# Patient Record
Sex: FEMALE | NOT HISPANIC OR LATINO
[De-identification: names, ages, dates, MRNs, and addresses within clinical notes are randomized per-mention and may not be internally consistent; named-entity substitution may affect disease eponyms.]

---

## 2018-11-26 PROBLEM — Z00.00 ENCOUNTER FOR PREVENTIVE HEALTH EXAMINATION: Status: ACTIVE | Noted: 2018-11-26

## 2018-12-21 ENCOUNTER — APPOINTMENT (OUTPATIENT)
Dept: UROLOGY | Facility: CLINIC | Age: 73
End: 2018-12-21
Payer: MEDICARE

## 2018-12-21 VITALS
BODY MASS INDEX: 32.46 KG/M2 | HEIGHT: 59 IN | SYSTOLIC BLOOD PRESSURE: 134 MMHG | WEIGHT: 161 LBS | HEART RATE: 73 BPM | RESPIRATION RATE: 17 BRPM | TEMPERATURE: 98.2 F | DIASTOLIC BLOOD PRESSURE: 76 MMHG

## 2018-12-21 DIAGNOSIS — Z82.49 FAMILY HISTORY OF ISCHEMIC HEART DISEASE AND OTHER DISEASES OF THE CIRCULATORY SYSTEM: ICD-10-CM

## 2018-12-21 DIAGNOSIS — Z86.79 PERSONAL HISTORY OF OTHER DISEASES OF THE CIRCULATORY SYSTEM: ICD-10-CM

## 2018-12-21 DIAGNOSIS — Z80.3 FAMILY HISTORY OF MALIGNANT NEOPLASM OF BREAST: ICD-10-CM

## 2018-12-21 DIAGNOSIS — Z87.39 PERSONAL HISTORY OF OTHER DISEASES OF THE MUSCULOSKELETAL SYSTEM AND CONNECTIVE TISSUE: ICD-10-CM

## 2018-12-21 DIAGNOSIS — Z82.3 FAMILY HISTORY OF STROKE: ICD-10-CM

## 2018-12-21 DIAGNOSIS — H91.93 UNSPECIFIED HEARING LOSS, BILATERAL: ICD-10-CM

## 2018-12-21 DIAGNOSIS — Z86.018 PERSONAL HISTORY OF OTHER BENIGN NEOPLASM: ICD-10-CM

## 2018-12-21 DIAGNOSIS — Z78.9 OTHER SPECIFIED HEALTH STATUS: ICD-10-CM

## 2018-12-21 DIAGNOSIS — Z86.39 PERSONAL HISTORY OF OTHER ENDOCRINE, NUTRITIONAL AND METABOLIC DISEASE: ICD-10-CM

## 2018-12-21 PROCEDURE — 99203 OFFICE O/P NEW LOW 30 MIN: CPT

## 2018-12-24 LAB
APPEARANCE: CLEAR
BACTERIA: NEGATIVE
BILIRUBIN URINE: NEGATIVE
BLOOD URINE: NEGATIVE
COLOR: ABNORMAL
GLUCOSE QUALITATIVE U: NEGATIVE MG/DL
KETONES URINE: NEGATIVE
LEUKOCYTE ESTERASE URINE: NEGATIVE
MICROSCOPIC-UA: NORMAL
NITRITE URINE: NEGATIVE
PH URINE: 7
PROTEIN URINE: NEGATIVE MG/DL
RED BLOOD CELLS URINE: 1 /HPF
SPECIFIC GRAVITY URINE: 1.01
SQUAMOUS EPITHELIAL CELLS: 0 /HPF
UROBILINOGEN URINE: NEGATIVE MG/DL
WHITE BLOOD CELLS URINE: 2 /HPF

## 2018-12-27 RX ORDER — ATENOLOL 25 MG/1
25 TABLET ORAL
Refills: 0 | Status: ACTIVE | COMMUNITY

## 2018-12-27 RX ORDER — CHOLECALCIFEROL (VITAMIN D3) 25 MCG
TABLET,CHEWABLE ORAL
Refills: 0 | Status: ACTIVE | COMMUNITY

## 2018-12-27 RX ORDER — LECITHIN 1200 MG
CAPSULE ORAL
Refills: 0 | Status: ACTIVE | COMMUNITY

## 2018-12-27 RX ORDER — FLAXSEED OIL 1000 MG
CAPSULE ORAL
Refills: 0 | Status: ACTIVE | COMMUNITY

## 2018-12-27 RX ORDER — MAG HYDROX/ALUMINUM HYD/SIMETH 400-400-40
SUSPENSION, ORAL (FINAL DOSE FORM) ORAL
Refills: 0 | Status: ACTIVE | COMMUNITY

## 2018-12-27 RX ORDER — THYROID, PORCINE 90 MG/1
TABLET ORAL
Refills: 0 | Status: ACTIVE | COMMUNITY

## 2019-01-09 ENCOUNTER — APPOINTMENT (OUTPATIENT)
Age: 74
End: 2019-01-09
Payer: MEDICARE

## 2019-01-09 ENCOUNTER — TRANSCRIPTION ENCOUNTER (OUTPATIENT)
Age: 74
End: 2019-01-09

## 2019-01-09 DIAGNOSIS — N28.89 OTHER SPECIFIED DISORDERS OF KIDNEY AND URETER: ICD-10-CM

## 2019-01-09 PROCEDURE — 99214 OFFICE O/P EST MOD 30 MIN: CPT

## 2019-01-15 NOTE — ASSESSMENT
[FreeTextEntry1] : CT scan reviewed- no renal mass.  small stones, up to ~ 3-4 mm right lower.  Small parapelvic cyst left lower.  Clips c/w prior surgery around left lower pole.  \par \par Possible left renal artery stenosis, reported as 'thrombus', but unsure of that--appears possible, or else stenosis which could play into concerns about recent BP spiking..There appears to be some atherosclerotic change on delayed films, but what appears to be filling defect within the left renal artery on arterial phase.\par \par Recommend vascular surgery eval (either here, or nearer her home), as renal artery stenosis may be causative for unstable hypertensive episodes.\par RTC 6 months with renal us with us\par stone prevention diet reviewed, sheets given

## 2019-01-15 NOTE — PHYSICAL EXAM
[General Appearance - Well Developed] : well developed [General Appearance - Well Nourished] : well nourished [Normal Appearance] : normal appearance [Well Groomed] : well groomed [General Appearance - In No Acute Distress] : no acute distress [Abdomen Soft] : soft [Abdomen Tenderness] : non-tender [Costovertebral Angle Tenderness] : no ~M costovertebral angle tenderness

## 2019-01-15 NOTE — HISTORY OF PRESENT ILLNESS
[FreeTextEntry1] : Pt is 72 yo female with h/o left renal cyst excision at [facility] (by me) in 2006- had done well since, though I haven't seen since. Has not had any new clinical issues to report: no flank or abdominal pain, no gross hematuria, no voiding symptoms such as dysuria, urge/frequency. \par \par BP changes ("spikes at night") recently led to further study. Had recent renal us for f/u imaging, which demonstrated new findings: arrives for evaluation and discussion. No recent 3 d imaging with CT or MRI, but she returns today with CT scan w/wo IV contrast.\par

## 2019-01-15 NOTE — LETTER BODY
[Dear  ___] : Dear  [unfilled], [Consult Letter:] : I had the pleasure of evaluating your patient, [unfilled]. [Please see my note below.] : Please see my note below. [Consult Closing:] : Thank you very much for allowing me to participate in the care of this patient.  If you have any questions, please do not hesitate to contact me. [Sincerely,] : Sincerely, [FreeTextEntry1] : Pt is 72 yo female with h/o left renal cyst excision at [facility] (by me) in 2006- had done well since, though I haven't seen since. Has not had any new clinical issues to report: no flank or abdominal pain, no gross hematuria, no voiding symptoms such as dysuria, urge/frequency. \par \par BP changes ("spikes at night") recently led to further study. Had recent renal us for f/u imaging, which demonstrated new findings: arrives for evaluation and discussion. No recent 3 d imaging with CT or MRI, but she returns today with CT scan w/wo IV contrast.\par \par \par CT scan reviewed- no renal mass.  small stones, up to ~ 3-4 mm right lower.  Small parapelvic cyst left lower.  Clips c/w prior surgery around left lower pole.  \par \par Possible left renal artery stenosis, reported as 'thrombus', but unsure of that--appears possible, or else stenosis which could play into concerns about recent BP spiking..There appears to be some atherosclerotic change on delayed films, but what appears to be filling defect within the left renal artery on arterial phase.\par \par Recommend vascular surgery eval (either here, or nearer her home), as renal artery stenosis may be causative for unstable hypertensive episodes.\par RTC 6 months with renal us with us\par stone prevention diet reviewed, sheets given

## 2019-03-04 ENCOUNTER — TRANSCRIPTION ENCOUNTER (OUTPATIENT)
Age: 74
End: 2019-03-04

## 2019-07-05 ENCOUNTER — OUTPATIENT (OUTPATIENT)
Dept: OUTPATIENT SERVICES | Facility: HOSPITAL | Age: 74
LOS: 1 days | End: 2019-07-05
Payer: MEDICARE

## 2019-07-05 ENCOUNTER — APPOINTMENT (OUTPATIENT)
Dept: UROLOGY | Facility: CLINIC | Age: 74
End: 2019-07-05
Payer: MEDICARE

## 2019-07-05 PROCEDURE — 99213 OFFICE O/P EST LOW 20 MIN: CPT | Mod: 25

## 2019-07-05 PROCEDURE — 76775 US EXAM ABDO BACK WALL LIM: CPT | Mod: 26

## 2019-07-05 PROCEDURE — 76775 US EXAM ABDO BACK WALL LIM: CPT

## 2019-07-05 NOTE — PHYSICAL EXAM
[General Appearance - Well Developed] : well developed [General Appearance - Well Nourished] : well nourished [Normal Appearance] : normal appearance [Well Groomed] : well groomed [Abdomen Soft] : soft [General Appearance - In No Acute Distress] : no acute distress [Costovertebral Angle Tenderness] : no ~M costovertebral angle tenderness [] : no respiratory distress [Abdomen Tenderness] : non-tender [Exaggerated Use Of Accessory Muscles For Inspiration] : no accessory muscle use [Respiration, Rhythm And Depth] : normal respiratory rhythm and effort [Oriented To Time, Place, And Person] : oriented to person, place, and time [Mood] : the mood was normal [Not Anxious] : not anxious [Affect] : the affect was normal [Normal Station and Gait] : the gait and station were normal for the patient's age [No Focal Deficits] : no focal deficits

## 2019-07-05 NOTE — ASSESSMENT
[FreeTextEntry1] : Renal US films reviewed: No stone visualized either kidney. Stable post surgical changes in the lower pole left kidney and bilateral sub centimeter simple para pelvic cysts. Both kidneys are normal in size and echogenicity without hydronephrosis or solid masses. \par \par Doing well from  perspective\par \par Plan: RTC 6 months with f/u renal us\par No sig stones seen; no evidence for sig progression of small stones seen on prior CT scan\par pt due for f/u with vascular surgeon as well

## 2019-07-05 NOTE — HISTORY OF PRESENT ILLNESS
[None] : no symptoms [FreeTextEntry1] : Pt is 72 yo female with likely mild renal artery stenosis, f/u with Vascular surgeon for that and left carotid.  O/w, blood pressure currently under 'good' control, perhaps 6 or 7 occasions where she needed additional atenolol.  \par \par No flank or abdominal pain, no changes to urinary symptoms or hematuria.

## 2019-07-08 DIAGNOSIS — N28.1 CYST OF KIDNEY, ACQUIRED: ICD-10-CM

## 2019-07-08 DIAGNOSIS — N20.0 CALCULUS OF KIDNEY: ICD-10-CM

## 2020-01-15 ENCOUNTER — APPOINTMENT (OUTPATIENT)
Dept: UROLOGY | Facility: CLINIC | Age: 75
End: 2020-01-15
Payer: MEDICARE

## 2020-01-15 ENCOUNTER — OUTPATIENT (OUTPATIENT)
Dept: OUTPATIENT SERVICES | Facility: HOSPITAL | Age: 75
LOS: 1 days | End: 2020-01-15
Payer: MEDICARE

## 2020-01-15 DIAGNOSIS — R35.0 FREQUENCY OF MICTURITION: ICD-10-CM

## 2020-01-15 PROCEDURE — 99213 OFFICE O/P EST LOW 20 MIN: CPT | Mod: 25

## 2020-01-15 PROCEDURE — 76775 US EXAM ABDO BACK WALL LIM: CPT | Mod: 26

## 2020-01-15 PROCEDURE — 76775 US EXAM ABDO BACK WALL LIM: CPT

## 2020-01-15 NOTE — PHYSICAL EXAM
[Normal Appearance] : normal appearance [General Appearance - Well Nourished] : well nourished [General Appearance - Well Developed] : well developed [Abdomen Soft] : soft [Well Groomed] : well groomed [General Appearance - In No Acute Distress] : no acute distress [Abdomen Tenderness] : non-tender [Costovertebral Angle Tenderness] : no ~M costovertebral angle tenderness [] : no respiratory distress [Exaggerated Use Of Accessory Muscles For Inspiration] : no accessory muscle use [Oriented To Time, Place, And Person] : oriented to person, place, and time [Affect] : the affect was normal [Respiration, Rhythm And Depth] : normal respiratory rhythm and effort [Mood] : the mood was normal [Normal Station and Gait] : the gait and station were normal for the patient's age [Not Anxious] : not anxious [No Focal Deficits] : no focal deficits

## 2020-01-15 NOTE — HISTORY OF PRESENT ILLNESS
[FreeTextEntry1] : Pt is 73 yo female with likely mild renal artery stenosis, f/u with Vascular surgeon for that and left carotid.  O/w, blood pressure currently under 'good' control, perhaps 6 or 7 occasions where she needed additional atenolol.  No new complaints.\par \par No flank or abdominal pain, no changes to urinary symptoms or hematuria.   [None] : no symptoms

## 2020-01-15 NOTE — ASSESSMENT
[FreeTextEntry1] : Renal US reviewed: no stone visualized today, no hydro. Likely no change to small left parapelvic renal cyst. right renal pelvis with mild fullness. Increased RI c/w recent vascular study suggestive of 60% right renal artery stenosis. No solid mass.\par \par Comparison to CT 12/2018--- likely small stones not visualized on the current study. Overall appears stable, and pt doing well from  perspective\par \par Plan: RTC 12 months with f/u renal us\par No sig stones seen; no evidence for sig progression of small stones seen on prior CT scan\par f/u

## 2020-01-23 DIAGNOSIS — N20.0 CALCULUS OF KIDNEY: ICD-10-CM

## 2020-01-23 DIAGNOSIS — N28.1 CYST OF KIDNEY, ACQUIRED: ICD-10-CM

## 2020-01-23 DIAGNOSIS — I70.1 ATHEROSCLEROSIS OF RENAL ARTERY: ICD-10-CM

## 2021-03-31 ENCOUNTER — APPOINTMENT (OUTPATIENT)
Dept: UROLOGY | Facility: CLINIC | Age: 76
End: 2021-03-31
Payer: MEDICARE

## 2021-03-31 ENCOUNTER — APPOINTMENT (OUTPATIENT)
Dept: UROLOGY | Facility: CLINIC | Age: 76
End: 2021-03-31

## 2021-03-31 ENCOUNTER — OUTPATIENT (OUTPATIENT)
Dept: OUTPATIENT SERVICES | Facility: HOSPITAL | Age: 76
LOS: 1 days | End: 2021-03-31
Payer: MEDICARE

## 2021-03-31 DIAGNOSIS — R35.0 FREQUENCY OF MICTURITION: ICD-10-CM

## 2021-03-31 PROCEDURE — 76775 US EXAM ABDO BACK WALL LIM: CPT | Mod: 26

## 2021-03-31 PROCEDURE — 76775 US EXAM ABDO BACK WALL LIM: CPT

## 2021-03-31 PROCEDURE — 99214 OFFICE O/P EST MOD 30 MIN: CPT | Mod: 25

## 2021-03-31 NOTE — HISTORY OF PRESENT ILLNESS
[None] : no symptoms [FreeTextEntry1] : Pt is 74 yo female with likely mild renal artery stenosis, f/u with Vascular surgeon for that and left carotid.  O/w, blood pressure currently under 'good' control, perhaps 6 or 7 occasions where she needed additional atenolol.  No new complaints.  F/u today with renal US, no sig clinical changes\par \par No flank or abdominal pain, no changes to urinary symptoms or hematuria.

## 2021-03-31 NOTE — PHYSICAL EXAM
[General Appearance - Well Nourished] : well nourished [General Appearance - Well Developed] : well developed [Normal Appearance] : normal appearance [Well Groomed] : well groomed [General Appearance - In No Acute Distress] : no acute distress [Abdomen Soft] : soft [Abdomen Tenderness] : non-tender [Costovertebral Angle Tenderness] : no ~M costovertebral angle tenderness [] : no respiratory distress [Respiration, Rhythm And Depth] : normal respiratory rhythm and effort [Exaggerated Use Of Accessory Muscles For Inspiration] : no accessory muscle use [Oriented To Time, Place, And Person] : oriented to person, place, and time [Affect] : the affect was normal [Not Anxious] : not anxious [Mood] : the mood was normal [Normal Station and Gait] : the gait and station were normal for the patient's age [No Focal Deficits] : no focal deficits

## 2021-03-31 NOTE — ASSESSMENT
[FreeTextEntry1] : Renal US reviewed: No stone in either kidney, no hydronephrosis, no definite mass visualized in the left kidney; surgical changes and an echogenic focus slightly vascular with shadow possible AML vs intrarenal fat and a para pelvic cyst again visualized in the lower pole. Both kidneys are normal in size and echogenicity without obvious stones or  hydronephrosis visualized. \par \par Comparison to CT 12/2018--- likely small stones not visualized on the current study. Overall appears stable, and pt doing well from  perspective\par \par D/w pt- doubt sig finding left lower pole; ? intrarenal fat vs AML. We discussed imaging; nothing seen on outside renal/abdominal US.  Further imaging with CT w/wo IV contrast or MRI w/wo IV contrast could be done.\par \par Plan: pt prefers to review CT scan for f/u ? renal mass.\par \par CT w/wo IV contrast renal mass protocol, and eval current stones

## 2021-04-02 DIAGNOSIS — N28.1 CYST OF KIDNEY, ACQUIRED: ICD-10-CM

## 2021-04-02 DIAGNOSIS — N20.0 CALCULUS OF KIDNEY: ICD-10-CM

## 2021-04-02 DIAGNOSIS — I70.1 ATHEROSCLEROSIS OF RENAL ARTERY: ICD-10-CM

## 2021-04-19 ENCOUNTER — TRANSCRIPTION ENCOUNTER (OUTPATIENT)
Age: 76
End: 2021-04-19

## 2021-04-21 ENCOUNTER — TRANSCRIPTION ENCOUNTER (OUTPATIENT)
Age: 76
End: 2021-04-21

## 2021-04-23 ENCOUNTER — NON-APPOINTMENT (OUTPATIENT)
Age: 76
End: 2021-04-23

## 2022-03-30 ENCOUNTER — APPOINTMENT (OUTPATIENT)
Dept: UROLOGY | Facility: CLINIC | Age: 77
End: 2022-03-30
Payer: MEDICARE

## 2022-03-30 ENCOUNTER — OUTPATIENT (OUTPATIENT)
Dept: OUTPATIENT SERVICES | Facility: HOSPITAL | Age: 77
LOS: 1 days | End: 2022-03-30
Payer: MEDICARE

## 2022-03-30 DIAGNOSIS — N28.1 CYST OF KIDNEY, ACQUIRED: ICD-10-CM

## 2022-03-30 DIAGNOSIS — N20.0 CALCULUS OF KIDNEY: ICD-10-CM

## 2022-03-30 DIAGNOSIS — I70.1 ATHEROSCLEROSIS OF RENAL ARTERY: ICD-10-CM

## 2022-03-30 DIAGNOSIS — R35.0 FREQUENCY OF MICTURITION: ICD-10-CM

## 2022-03-30 PROCEDURE — 99214 OFFICE O/P EST MOD 30 MIN: CPT | Mod: 25

## 2022-03-30 PROCEDURE — 76775 US EXAM ABDO BACK WALL LIM: CPT

## 2022-03-30 PROCEDURE — 76775 US EXAM ABDO BACK WALL LIM: CPT | Mod: 26

## 2022-03-30 NOTE — HISTORY OF PRESENT ILLNESS
[None] : None [FreeTextEntry1] : Pt is 76 yo female with likely mild renal artery stenosis, f/u with Vascular surgeon for that and left carotid.  O/w, blood pressure currently under 'good' control, perhaps 6 or 7 occasions where she needed additional atenolol.  No new complaints.  F/u today with renal US, no sig clinical changes\par \par No flank or abdominal pain, no changes to urinary symptoms or hematuria.

## 2022-03-30 NOTE — ASSESSMENT
[FreeTextEntry1] : Renal US reviewed: No stone in either kidney, no hydronephrosis, no definite mass visualized in the left kidney; surgical changes and a echogenic foci likely unchanged in the lower pole. There is twinkle without shadowing. Both kidneys are normal in size and echogenicity without definite stones or hydronephrosis visualized, no solid renal mass.. Resistive indices at range for hypertensive, and relatively similar right to left on comparison\par \par Comparison to 4/2021 CT does not show definitive stone, possible small stone vs parenchymal calcification. Recommend serial US imaging. \par \par D/w pt-\par prefers f/u in 1 year with renal US- will schedule

## 2022-03-30 NOTE — PHYSICAL EXAM
[General Appearance - Well Developed] : well developed [General Appearance - Well Nourished] : well nourished [Normal Appearance] : normal appearance [Well Groomed] : well groomed [General Appearance - In No Acute Distress] : no acute distress [Abdomen Soft] : soft [Abdomen Tenderness] : non-tender [Costovertebral Angle Tenderness] : no ~M costovertebral angle tenderness [Respiration, Rhythm And Depth] : normal respiratory rhythm and effort [Exaggerated Use Of Accessory Muscles For Inspiration] : no accessory muscle use [Oriented To Time, Place, And Person] : oriented to person, place, and time [Affect] : the affect was normal [Mood] : the mood was normal [Not Anxious] : not anxious [Normal Station and Gait] : the gait and station were normal for the patient's age [No Focal Deficits] : no focal deficits [] : no rash [FreeTextEntry1] : no jvd

## 2023-01-10 ENCOUNTER — TRANSCRIPTION ENCOUNTER (OUTPATIENT)
Age: 78
End: 2023-01-10

## 2023-01-11 ENCOUNTER — TRANSCRIPTION ENCOUNTER (OUTPATIENT)
Age: 78
End: 2023-01-11

## 2023-01-12 ENCOUNTER — TRANSCRIPTION ENCOUNTER (OUTPATIENT)
Age: 78
End: 2023-01-12

## 2023-01-13 ENCOUNTER — TRANSCRIPTION ENCOUNTER (OUTPATIENT)
Age: 78
End: 2023-01-13

## 2023-02-06 ENCOUNTER — TRANSCRIPTION ENCOUNTER (OUTPATIENT)
Age: 78
End: 2023-02-06

## 2023-02-07 ENCOUNTER — TRANSCRIPTION ENCOUNTER (OUTPATIENT)
Age: 78
End: 2023-02-07

## 2023-02-07 ENCOUNTER — APPOINTMENT (OUTPATIENT)
Dept: UROLOGY | Facility: CLINIC | Age: 78
End: 2023-02-07

## 2023-02-10 ENCOUNTER — TRANSCRIPTION ENCOUNTER (OUTPATIENT)
Age: 78
End: 2023-02-10

## 2023-02-13 ENCOUNTER — TRANSCRIPTION ENCOUNTER (OUTPATIENT)
Age: 78
End: 2023-02-13

## 2023-02-14 ENCOUNTER — APPOINTMENT (OUTPATIENT)
Dept: UROLOGY | Facility: CLINIC | Age: 78
End: 2023-02-14
Payer: MEDICARE

## 2023-02-14 PROCEDURE — 99442: CPT | Mod: 95

## 2023-02-14 NOTE — ASSESSMENT
[FreeTextEntry1] : All films reviewed, d/w pt.\par \par She will come for sched appt with office renal US in f/u to re-eval and compare.

## 2023-02-14 NOTE — HISTORY OF PRESENT ILLNESS
[Other Location: e.g. School (Enter Location, City,State)___] : at [unfilled], at the time of the visit. [Other Location: e.g. Home (Enter Location, City,State)___] : at [unfilled] [Verbal consent obtained from patient] : the patient, [unfilled] [FreeTextEntry1] : The patient-doctor relationship has been established in a face to face fashion via real time video/audio HIPAA compliant communication using telemedicine software. The patient's identity has been confirmed. The patient was previously emailed a copy of the telemedicine consent. They have had a chance to review and has now given verbal consent and has requested care to be assessed and treated via telemedicine. They understand there may be limitations in this process, and that they may need further followup care in the office and/or hospital settings.\par \par Verbal consent given on Feb 14 2023 10:00AM\par \par SHIVAM JOHNSON is a 77 year F who presents for f/u stone, complex renal cyst, mild left renal artery stenosis. Previous left renal complex cyst excision [facility] in 2006.\par \par Had Er visit with right flank pain, extreme difficulty with urination, some reported blood on UA, and sx resolved after approx 4 hours ---on 12/26/22. Renal US done showing mildly complex 1.5 cm left lower pole parapelvic cyst, ? small stones on left.\par \par Pt had MRI post to eval ([facility])- no sig complexity to cyst.\par \par Feeling well at this time.\par \par Renal US and MRI reviewed: echogenic focus left upper parenchyma, but appears vascular and non-shadowing. Similar left lower pole. Neither with shadowing.\par \par \par 02/14/2023 \par \par The patient denies fevers, chills, nausea and/or vomiting, and no unexplained weight loss.\par \par All pertinent parts of the patient PFSH (past medical, family, and social histories), laboratory, radiological studies and available physician notes were reviewed prior to starting the face-to-face portion of the telemedicine visit. Questionnaire results, where appropriate, were discussed with the patient.\par

## 2023-03-29 ENCOUNTER — OUTPATIENT (OUTPATIENT)
Dept: OUTPATIENT SERVICES | Facility: HOSPITAL | Age: 78
LOS: 1 days | End: 2023-03-29
Payer: MEDICARE

## 2023-03-29 ENCOUNTER — APPOINTMENT (OUTPATIENT)
Dept: UROLOGY | Facility: CLINIC | Age: 78
End: 2023-03-29
Payer: MEDICARE

## 2023-03-29 DIAGNOSIS — I70.1 ATHEROSCLEROSIS OF RENAL ARTERY: ICD-10-CM

## 2023-03-29 DIAGNOSIS — R35.0 FREQUENCY OF MICTURITION: ICD-10-CM

## 2023-03-29 PROCEDURE — 99214 OFFICE O/P EST MOD 30 MIN: CPT | Mod: 25

## 2023-03-29 PROCEDURE — 76775 US EXAM ABDO BACK WALL LIM: CPT | Mod: 26

## 2023-03-29 PROCEDURE — 76775 US EXAM ABDO BACK WALL LIM: CPT

## 2023-03-29 NOTE — ASSESSMENT
[FreeTextEntry1] : Renal US reviewed: no stone on right. left renal echogenic focus lower pole, without clear shadowing, stable and unchanged- no solid renal mass. left lower pole small cyst unchanged, no sig complexity. No sig hydronephrosis either side; suspect small cyst parapelvic on left. \par \par MRI compared.\par \par plan \par 1) urine culture today \par 2) Doing well overall\par 3) u/a, culture- will review by phone

## 2023-03-29 NOTE — PHYSICAL EXAM
[General Appearance - Well Developed] : well developed [Abdomen Soft] : soft [Abdomen Tenderness] : non-tender [Costovertebral Angle Tenderness] : no ~M costovertebral angle tenderness [Skin Color & Pigmentation] : normal skin color and pigmentation [Edema] : no peripheral edema [] : no respiratory distress [Oriented To Time, Place, And Person] : oriented to person, place, and time [Normal Station and Gait] : the gait and station were normal for the patient's age [No Focal Deficits] : no focal deficits

## 2023-03-29 NOTE — HISTORY OF PRESENT ILLNESS
[None] : None [FreeTextEntry1] : Pt is 76 yo female presents for f/u stone, complex renal cyst, mild left renal artery stenosis. Previous left renal complex cyst excision [facility] in 2006.\par \par Had Er visit with right flank pain, extreme difficulty with urination, some reported blood on UA, and sx resolved after approx 4 hours ---on 12/26/22. Renal US done showing mildly complex 1.5 cm left lower pole parapelvic cyst, ? small stones on left.\par \par Pt had MRI post to eval ([facility])- no sig complexity to cyst. No hydronephrosis, no renal mass.\par \par In February 2023, patient complained of right flank pain and inability to urine. The pain has resolved and no voiding issue since. \par \par No flank or abdominal pain, no changes to urinary symptoms or hematuria. \par

## 2023-03-31 LAB
APPEARANCE: CLEAR
BACTERIA UR CULT: NORMAL
BACTERIA: NEGATIVE
BILIRUBIN URINE: NEGATIVE
BLOOD URINE: NEGATIVE
COLOR: ABNORMAL
GLUCOSE QUALITATIVE U: NEGATIVE
HYALINE CASTS: 0 /LPF
KETONES URINE: NEGATIVE
LEUKOCYTE ESTERASE URINE: NEGATIVE
MICROSCOPIC-UA: NORMAL
NITRITE URINE: NEGATIVE
PH URINE: 6
PROTEIN URINE: NEGATIVE
RED BLOOD CELLS URINE: 1 /HPF
SPECIFIC GRAVITY URINE: 1.01
SQUAMOUS EPITHELIAL CELLS: 1 /HPF
UROBILINOGEN URINE: NORMAL
WHITE BLOOD CELLS URINE: 1 /HPF

## 2024-03-20 ENCOUNTER — APPOINTMENT (OUTPATIENT)
Dept: UROLOGY | Facility: CLINIC | Age: 79
End: 2024-03-20
Payer: MEDICARE

## 2024-03-20 ENCOUNTER — OUTPATIENT (OUTPATIENT)
Dept: OUTPATIENT SERVICES | Facility: HOSPITAL | Age: 79
LOS: 1 days | End: 2024-03-20
Payer: MEDICARE

## 2024-03-20 DIAGNOSIS — R35.0 FREQUENCY OF MICTURITION: ICD-10-CM

## 2024-03-20 DIAGNOSIS — N28.1 CYST OF KIDNEY, ACQUIRED: ICD-10-CM

## 2024-03-20 DIAGNOSIS — N20.0 CALCULUS OF KIDNEY: ICD-10-CM

## 2024-03-20 PROCEDURE — 76770 US EXAM ABDO BACK WALL COMP: CPT | Mod: 26

## 2024-03-20 PROCEDURE — 99213 OFFICE O/P EST LOW 20 MIN: CPT | Mod: 25

## 2024-03-20 PROCEDURE — 76770 US EXAM ABDO BACK WALL COMP: CPT

## 2024-03-20 NOTE — HISTORY OF PRESENT ILLNESS
[None] : no symptoms [FreeTextEntry1] : Pt is 79 yo female presents for f/u stone, complex renal cyst, mild left renal artery stenosis. Previous left renal complex cyst excision [facility] in 2006.  Had Er visit with right flank pain, extreme difficulty with urination, some reported blood on UA, and sx resolved after approx 4 hours ---on 12/26/22. Renal US done showing mildly complex 1.5 cm left lower pole parapelvic cyst, ? small stones on left.  Pt had MRI post to eval ([facility])- no sig complexity to cyst. No hydronephrosis, no renal mass.  In February 2023, patient complained of right flank pain and inability to urine. The pain has resolved and no voiding issue since.   No flank or abdominal pain, no changes to urinary symptoms or hematuria.   In the past year, she was diagnosed with Polymyalgia rheumatica- taking prednisone 7.5 mg daily. It is being tapered.  Right knee replacement 04/2023- doing well.

## 2024-03-20 NOTE — ASSESSMENT
[FreeTextEntry1] : Renal US reviewed: no stones, solid masses or hydronephrosis. Excellent, stable  MRI compared.  Doing well overall. Diet mod reviewed, continue  plan  1) RTC in 12 months with Renal US

## 2024-03-21 DIAGNOSIS — N28.1 CYST OF KIDNEY, ACQUIRED: ICD-10-CM

## 2024-03-21 DIAGNOSIS — N20.0 CALCULUS OF KIDNEY: ICD-10-CM

## 2024-12-25 PROBLEM — F10.90 ALCOHOL USE: Status: ACTIVE | Noted: 2018-12-21

## 2025-03-26 ENCOUNTER — APPOINTMENT (OUTPATIENT)
Dept: UROLOGY | Facility: CLINIC | Age: 80
End: 2025-03-26
Payer: MEDICARE

## 2025-03-26 ENCOUNTER — OUTPATIENT (OUTPATIENT)
Dept: OUTPATIENT SERVICES | Facility: HOSPITAL | Age: 80
LOS: 1 days | End: 2025-03-26
Payer: MEDICARE

## 2025-03-26 DIAGNOSIS — N28.1 CYST OF KIDNEY, ACQUIRED: ICD-10-CM

## 2025-03-26 DIAGNOSIS — R35.0 FREQUENCY OF MICTURITION: ICD-10-CM

## 2025-03-26 DIAGNOSIS — I70.1 ATHEROSCLEROSIS OF RENAL ARTERY: ICD-10-CM

## 2025-03-26 DIAGNOSIS — N20.0 CALCULUS OF KIDNEY: ICD-10-CM

## 2025-03-26 PROCEDURE — 99213 OFFICE O/P EST LOW 20 MIN: CPT | Mod: 25

## 2025-03-26 PROCEDURE — 76770 US EXAM ABDO BACK WALL COMP: CPT | Mod: 26

## 2025-03-26 PROCEDURE — 76770 US EXAM ABDO BACK WALL COMP: CPT

## 2025-03-28 DIAGNOSIS — I70.1 ATHEROSCLEROSIS OF RENAL ARTERY: ICD-10-CM

## 2025-03-28 DIAGNOSIS — N28.1 CYST OF KIDNEY, ACQUIRED: ICD-10-CM

## 2025-03-28 DIAGNOSIS — N20.0 CALCULUS OF KIDNEY: ICD-10-CM
